# Patient Record
Sex: FEMALE | Race: OTHER | NOT HISPANIC OR LATINO | URBAN - METROPOLITAN AREA
[De-identification: names, ages, dates, MRNs, and addresses within clinical notes are randomized per-mention and may not be internally consistent; named-entity substitution may affect disease eponyms.]

---

## 2022-03-11 ENCOUNTER — EMERGENCY (EMERGENCY)
Facility: HOSPITAL | Age: 27
LOS: 1 days | Discharge: HOME | End: 2022-03-11
Attending: EMERGENCY MEDICINE | Admitting: EMERGENCY MEDICINE
Payer: SELF-PAY

## 2022-03-11 VITALS
SYSTOLIC BLOOD PRESSURE: 127 MMHG | TEMPERATURE: 98 F | OXYGEN SATURATION: 100 % | HEART RATE: 78 BPM | RESPIRATION RATE: 16 BRPM | DIASTOLIC BLOOD PRESSURE: 84 MMHG

## 2022-03-11 PROCEDURE — 99283 EMERGENCY DEPT VISIT LOW MDM: CPT

## 2022-03-11 NOTE — ED PROVIDER NOTE - PHYSICAL EXAMINATION
pt alert and can phonate well  h at/nc  perrl, conj clear, sclera anicteric,  neck supple  throat no exudate  cor rrr pos s1s2  lungs clear to asno wheeze  abd soft no r/g/t  ext no edema no deformities  neueo awake, lucid normal gait moves all extremities with strength  psych normal affect  vs reasonable   skin no redness, no warmth, no fluctuance  on breast exam for right breast one area of possible mass with minimal tenderness noted. Under right axilla, mild tenderness present with no clear mass. For left breast, 2 x 2 mobile tenderness mass in 5 o clock direction with generalized nonspecific tenderness under axilla. pt alert and can phonate well  h at/nc  perrl, conj clear, sclera anicteric,  neck supple  ext no edema no deformities  neueo awake, lucid normal gait moves all extremities with strength  psych normal affect  vs reasonable   skin no redness, no warmth, no fluctuance  on breast exam for right breast one area of possible mass with minimal tenderness noted. Under right axilla, mild tenderness present with no clear mass. For left breast, 2 x 2 mobile tenderness mass in 5 o clock direction with generalized nonspecific tenderness under axilla.

## 2022-03-11 NOTE — ED ADULT TRIAGE NOTE - CHIEF COMPLAINT QUOTE
Pt states she found mass to L breast 2 days ago and would like to be evaluated. States mass is painful to touch, not visible but palpable with no changes to skin. Pt with negative mammogram and breast US 4 months ago. States she is concerned r/t family history of cervical cancer. PMHx asthma.

## 2022-03-11 NOTE — ED PROVIDER NOTE - CLINICAL SUMMARY MEDICAL DECISION MAKING FREE TEXT BOX
25 y/o F p/w breast mass tenderness. No concern for infection. Will direct pt with follow up with PMD after checking pregnancy test.

## 2022-03-11 NOTE — ED PROVIDER NOTE - NSFOLLOWUPINSTRUCTIONS_ED_ALL_ED_FT
you have a breast mass.  the modalities used to analyze this and find out if it is cancer are not available in the hospital during the current evening hours  you will need to f/u with a primary care doctor outpt to get the studies done    You are being discharged from the Emergency Department after evaluation of your presenting problem.  You were found not to have an emergency that requires hospitalization or surgery, but this does not mean you do not have a health concern.  You should follow up with your primary care physician and any other physicians suggested at time of discharge.  Also, if your condition worsens or changes know that the emergency department is open and available 24 hours a day/ 7 days a week and you should return to us if you have concerns. Thank you for allowing us to participate in your care. you have a breast mass.  the modalities used to analyze this and find out if it is cancer are not available in the hospital during the current evening hours  you will need to f/u with a primary care doctor outpt to get the studies done    You are being discharged from the Emergency Department after evaluation of your presenting problem.  You were found not to have an emergency that requires hospitalization or surgery, but this does not mean you do not have a health concern.  You should follow up with your primary care physician and any other physicians suggested at time of discharge.  Also, if your condition worsens or changes know that the emergency department is open and available 24 hours a day/ 7 days a week and you should return to us if you have concerns. Thank you for allowing us to participate in your care.    gyn clinic [phone]

## 2022-03-11 NOTE — ED PROVIDER NOTE - NSFOLLOWUPCLINICS_GEN_ALL_ED_FT
[facility]ne[facility]stetric[facility]  Gynceology/OB  [address] [place] [address]  Phone: [phone]  Fax:     [facility]  OBGYN  [address] [place] [address]  Phone: [phone]  Fax: [phone]

## 2022-03-11 NOTE — ED PROVIDER NOTE - PATIENT PORTAL LINK FT
You can access the FollowMyHealth Patient Portal offered by [facility] by registering at the following website: [email or web address]. By joining [facility]’s FollowMyHealth portal, you will also be able to view your health information using other applications (apps) compatible with our system.